# Patient Record
Sex: MALE | Race: AMERICAN INDIAN OR ALASKA NATIVE | NOT HISPANIC OR LATINO
[De-identification: names, ages, dates, MRNs, and addresses within clinical notes are randomized per-mention and may not be internally consistent; named-entity substitution may affect disease eponyms.]

---

## 2017-02-19 ENCOUNTER — APPOINTMENT (OUTPATIENT)
Dept: MRI IMAGING | Facility: IMAGING CENTER | Age: 32
End: 2017-02-19

## 2017-02-19 ENCOUNTER — OUTPATIENT (OUTPATIENT)
Dept: OUTPATIENT SERVICES | Facility: HOSPITAL | Age: 32
LOS: 1 days | End: 2017-02-19
Payer: COMMERCIAL

## 2017-02-19 DIAGNOSIS — Z00.8 ENCOUNTER FOR OTHER GENERAL EXAMINATION: ICD-10-CM

## 2017-02-19 PROCEDURE — A9585: CPT

## 2017-02-19 PROCEDURE — 70553 MRI BRAIN STEM W/O & W/DYE: CPT

## 2017-08-22 ENCOUNTER — APPOINTMENT (OUTPATIENT)
Dept: OPHTHALMOLOGY | Facility: CLINIC | Age: 32
End: 2017-08-22
Payer: SELF-PAY

## 2017-08-22 PROCEDURE — 00009: CPT

## 2017-11-29 ENCOUNTER — APPOINTMENT (OUTPATIENT)
Dept: OPHTHALMOLOGY | Facility: CLINIC | Age: 32
End: 2017-11-29
Payer: COMMERCIAL

## 2017-11-29 PROCEDURE — 00009: CPT

## 2018-01-02 ENCOUNTER — RX RENEWAL (OUTPATIENT)
Age: 33
End: 2018-01-02

## 2018-01-02 DIAGNOSIS — H52.13 MYOPIA, BILATERAL: ICD-10-CM

## 2018-01-03 ENCOUNTER — RX RENEWAL (OUTPATIENT)
Age: 33
End: 2018-01-03

## 2018-01-10 ENCOUNTER — APPOINTMENT (OUTPATIENT)
Dept: OPHTHALMOLOGY | Facility: AMBULATORY MEDICAL SERVICES | Age: 33
End: 2018-01-10

## 2018-01-11 ENCOUNTER — APPOINTMENT (OUTPATIENT)
Dept: OPHTHALMOLOGY | Facility: CLINIC | Age: 33
End: 2018-01-11

## 2018-01-18 ENCOUNTER — APPOINTMENT (OUTPATIENT)
Dept: OPHTHALMOLOGY | Facility: CLINIC | Age: 33
End: 2018-01-18

## 2018-07-02 ENCOUNTER — APPOINTMENT (OUTPATIENT)
Dept: ORTHOPEDIC SURGERY | Facility: CLINIC | Age: 33
End: 2018-07-02
Payer: COMMERCIAL

## 2018-07-02 VITALS
HEIGHT: 71 IN | HEART RATE: 60 BPM | BODY MASS INDEX: 29.4 KG/M2 | SYSTOLIC BLOOD PRESSURE: 107 MMHG | DIASTOLIC BLOOD PRESSURE: 73 MMHG | WEIGHT: 210 LBS

## 2018-07-02 DIAGNOSIS — M25.511 PAIN IN RIGHT SHOULDER: ICD-10-CM

## 2018-07-02 PROCEDURE — 99204 OFFICE O/P NEW MOD 45 MIN: CPT

## 2018-07-02 PROCEDURE — 73030 X-RAY EXAM OF SHOULDER: CPT | Mod: RT

## 2018-07-02 RX ORDER — LEVOTHYROXINE SODIUM 100 UG/1
100 TABLET ORAL
Refills: 0 | Status: DISCONTINUED | COMMUNITY

## 2018-07-02 RX ORDER — OXYCODONE AND ACETAMINOPHEN 5; 325 MG/1; MG/1
5-325 TABLET ORAL
Qty: 20 | Refills: 0 | Status: DISCONTINUED | COMMUNITY
Start: 2018-01-03 | End: 2018-07-02

## 2018-07-02 RX ORDER — OFLOXACIN 3 MG/ML
0.3 SOLUTION/ DROPS OPHTHALMIC 4 TIMES DAILY
Qty: 1 | Refills: 5 | Status: DISCONTINUED | COMMUNITY
Start: 2018-01-02 | End: 2018-07-02

## 2018-07-02 RX ORDER — FLUOROMETHOLONE 1 MG/ML
0.1 SOLUTION/ DROPS OPHTHALMIC 4 TIMES DAILY
Qty: 1 | Refills: 5 | Status: DISCONTINUED | COMMUNITY
Start: 2018-01-02 | End: 2018-07-02

## 2018-07-03 ENCOUNTER — RX RENEWAL (OUTPATIENT)
Age: 33
End: 2018-07-03

## 2018-07-03 RX ORDER — MELOXICAM 15 MG/1
15 TABLET ORAL DAILY
Qty: 30 | Refills: 1 | Status: DISCONTINUED | COMMUNITY
Start: 2018-07-03 | End: 2018-07-03

## 2018-07-03 RX ORDER — MELOXICAM 15 MG/1
15 TABLET ORAL DAILY
Qty: 30 | Refills: 0 | Status: DISCONTINUED | COMMUNITY
Start: 2018-07-02 | End: 2018-07-03

## 2018-10-29 ENCOUNTER — APPOINTMENT (OUTPATIENT)
Dept: RADIOLOGY | Facility: CLINIC | Age: 33
End: 2018-10-29

## 2018-10-29 ENCOUNTER — OUTPATIENT (OUTPATIENT)
Dept: OUTPATIENT SERVICES | Facility: HOSPITAL | Age: 33
LOS: 1 days | End: 2018-10-29
Payer: COMMERCIAL

## 2018-10-29 DIAGNOSIS — R53.83 OTHER FATIGUE: ICD-10-CM

## 2018-10-29 PROCEDURE — 71046 X-RAY EXAM CHEST 2 VIEWS: CPT | Mod: 26

## 2018-10-29 PROCEDURE — 71046 X-RAY EXAM CHEST 2 VIEWS: CPT

## 2019-01-25 ENCOUNTER — TRANSCRIPTION ENCOUNTER (OUTPATIENT)
Age: 34
End: 2019-01-25

## 2019-08-26 ENCOUNTER — OUTPATIENT (OUTPATIENT)
Dept: OUTPATIENT SERVICES | Facility: HOSPITAL | Age: 34
LOS: 1 days | End: 2019-08-26
Payer: COMMERCIAL

## 2019-08-26 ENCOUNTER — APPOINTMENT (OUTPATIENT)
Dept: ULTRASOUND IMAGING | Facility: CLINIC | Age: 34
End: 2019-08-26
Payer: COMMERCIAL

## 2019-08-26 DIAGNOSIS — Z00.8 ENCOUNTER FOR OTHER GENERAL EXAMINATION: ICD-10-CM

## 2019-08-26 PROCEDURE — 76700 US EXAM ABDOM COMPLETE: CPT

## 2019-08-26 PROCEDURE — 76700 US EXAM ABDOM COMPLETE: CPT | Mod: 26

## 2019-12-12 ENCOUNTER — APPOINTMENT (OUTPATIENT)
Dept: HEPATOLOGY | Facility: CLINIC | Age: 34
End: 2019-12-12

## 2020-04-25 ENCOUNTER — MESSAGE (OUTPATIENT)
Age: 35
End: 2020-04-25

## 2020-05-03 LAB
SARS-COV-2 IGG SERPL IA-ACNC: <0.1 INDEX
SARS-COV-2 IGG SERPL QL IA: NEGATIVE

## 2020-05-07 ENCOUNTER — APPOINTMENT (OUTPATIENT)
Dept: DISASTER EMERGENCY | Facility: CLINIC | Age: 35
End: 2020-05-07

## 2021-08-04 ENCOUNTER — NON-APPOINTMENT (OUTPATIENT)
Age: 36
End: 2021-08-04

## 2021-08-25 ENCOUNTER — NON-APPOINTMENT (OUTPATIENT)
Age: 36
End: 2021-08-25

## 2021-08-25 ENCOUNTER — APPOINTMENT (OUTPATIENT)
Dept: OTOLARYNGOLOGY | Facility: CLINIC | Age: 36
End: 2021-08-25
Payer: COMMERCIAL

## 2021-08-25 VITALS
BODY MASS INDEX: 29.4 KG/M2 | TEMPERATURE: 97.2 F | HEART RATE: 75 BPM | SYSTOLIC BLOOD PRESSURE: 124 MMHG | HEIGHT: 71 IN | DIASTOLIC BLOOD PRESSURE: 75 MMHG | WEIGHT: 210 LBS

## 2021-08-25 DIAGNOSIS — H92.03 OTALGIA, BILATERAL: ICD-10-CM

## 2021-08-25 DIAGNOSIS — Z01.10 ENCOUNTER FOR EXAMINATION OF EARS AND HEARING W/OUT ABNORMAL FINDINGS: ICD-10-CM

## 2021-08-25 DIAGNOSIS — M26.609 UNSPECIFIED TEMPOROMANDIBULAR JOINT DISORDER: ICD-10-CM

## 2021-08-25 PROCEDURE — 92557 COMPREHENSIVE HEARING TEST: CPT

## 2021-08-25 PROCEDURE — 99203 OFFICE O/P NEW LOW 30 MIN: CPT | Mod: 25

## 2021-08-25 PROCEDURE — 92567 TYMPANOMETRY: CPT

## 2021-08-25 NOTE — HISTORY OF PRESENT ILLNESS
[No] : patient does not have a  history of radiation therapy [Ear Fullness] : ear fullness [Otalgia] : otalgia [None] : No risk factors have been identified. [de-identified] : 36 yo male\par Patient complains of bilateral ear pain x 6 months. States its an intermittent dull aching pain, worse at night and when exposed to cold air. He does grind his teeth at night. Feels like his hearing has decreased as well. Pt has no ear drainage, tinnitus, vertigo, nasal congestion, nasal discharge, epistaxis, sinus infections, facial pain, facial pressure, throat pain, dysphagia or fevers\par \par  [Otorrhea] : no otorrhea [Eustachian Tube Dysfunction] : no eustachian tube dysfunction [Cholesteatoma] : no cholesteatoma [Early Onset Hearing Loss] : no early onset hearing loss [Stroke] : no stroke [Allergic Rhinitis] : no allergic rhinitis [Adenoidectomy] : no adenoidectomy [Allergies] : no allergies [Asthma] : no asthma [Hyperthyroidism] : no hyperthyroidism [Sialadenitis] : no sialadenitis [Hodgkin Disease] : no hodgkin disease [Non-Hodgkin Lymphoma] : no non-hodgkin lymphoma [Graves Disease] : no graves disease [Thyroid Cancer] : no thyroid cancer

## 2021-08-25 NOTE — PHYSICAL EXAM
[Midline] : trachea located in midline position [Normal] : no rashes [de-identified] : b/l tenderness

## 2021-08-25 NOTE — END OF VISIT
[Time Spent: ___ minutes] : I have spent [unfilled] minutes of time on the encounter. [FreeTextEntry3] : I personally saw and examined LENNY WEI in detail. I spoke to VALARIE Baltazar regarding the assessment and plan of care. I reviewed the above assessment and plan of care, and agree. I have made changes in changes in the body of the note where appropriate.I personally reviewed the HPI, PMH, FH, SH, ROS and medications/allergies. I have spoken to VALARIE Baltazar regarding the history and have personally determined the assessment and plan of care, and documented this myself. I was present and participated in all key portions of the encounter and all procedures noted above. I have made changes in the body of the note where appropriate.\par \par Attesting Faculty: See Attending Signature Below \par \par \par

## 2021-08-25 NOTE — ASSESSMENT
[FreeTextEntry1] : Patient complains of decreased hearing and bilateral ear pain x 6 months \par \par Ear pain due to TMJ:\par -soft food diet \par -dental evaluation \par -Advil for pain \par -warm and cold compresses\par \par f/u prn

## 2022-03-07 ENCOUNTER — OUTPATIENT (OUTPATIENT)
Dept: OUTPATIENT SERVICES | Facility: HOSPITAL | Age: 37
LOS: 1 days | End: 2022-03-07
Payer: COMMERCIAL

## 2022-03-07 DIAGNOSIS — M54.2 CERVICALGIA: ICD-10-CM

## 2022-03-07 PROCEDURE — 72141 MRI NECK SPINE W/O DYE: CPT | Mod: 26

## 2022-03-07 PROCEDURE — 72141 MRI NECK SPINE W/O DYE: CPT

## 2022-05-01 ENCOUNTER — NON-APPOINTMENT (OUTPATIENT)
Age: 37
End: 2022-05-01

## 2022-11-28 DIAGNOSIS — E03.9 HYPOTHYROIDISM, UNSPECIFIED: ICD-10-CM

## 2022-11-28 DIAGNOSIS — Z83.1 FAMILY HISTORY OF OTHER INFECTIOUS AND PARASITIC DISEASES: ICD-10-CM

## 2022-11-28 RX ORDER — CYANOCOBALAMIN (VITAMIN B-12) 1000 MCG
TABLET ORAL
Refills: 0 | Status: ACTIVE | COMMUNITY

## 2022-11-28 RX ORDER — LEVOTHYROXINE SODIUM 137 UG/1
TABLET ORAL
Refills: 0 | Status: ACTIVE | COMMUNITY

## 2022-11-29 ENCOUNTER — APPOINTMENT (OUTPATIENT)
Dept: SURGERY | Facility: CLINIC | Age: 37
End: 2022-11-29

## 2022-11-29 ENCOUNTER — TRANSCRIPTION ENCOUNTER (OUTPATIENT)
Age: 37
End: 2022-11-29

## 2022-11-29 VITALS
SYSTOLIC BLOOD PRESSURE: 131 MMHG | DIASTOLIC BLOOD PRESSURE: 83 MMHG | HEIGHT: 71 IN | TEMPERATURE: 96.5 F | HEART RATE: 70 BPM | RESPIRATION RATE: 16 BRPM | OXYGEN SATURATION: 98 % | WEIGHT: 208 LBS | BODY MASS INDEX: 29.12 KG/M2

## 2022-11-29 PROCEDURE — 99203 OFFICE O/P NEW LOW 30 MIN: CPT

## 2022-11-29 NOTE — CONSULT LETTER
[Dear  ___] : Dear  [unfilled], [Consult Letter:] : I had the pleasure of evaluating your patient, [unfilled]. [Please see my note below.] : Please see my note below. [Consult Closing:] : Thank you very much for allowing me to participate in the care of this patient.  If you have any questions, please do not hesitate to contact me. [Sincerely,] : Sincerely, [FreeTextEntry3] : Ramses Bashir M.D., F.A.C.S, F.A.S.C.R.S

## 2022-11-29 NOTE — ASSESSMENT
[FreeTextEntry1] : In summary the patient has had left groin pain for the past several months.  He physically active playing cricket.  There is no left inguinal hernia palpable on exam.  I suspect he either has a left groin strain or possibly epididymitis.  I referred him to urology for evaluation of epididymitis and recommended a trial of nonsteroidal anti-inflammatories in the meantime.

## 2022-11-29 NOTE — PHYSICAL EXAM
[Normal Breath Sounds] : Normal breath sounds [Normal Heart Sounds] : normal heart sounds [No Rash or Lesion] : No rash or lesion [Alert] : alert [Oriented to Person] : oriented to person [Oriented to Place] : oriented to place [Oriented to Time] : oriented to time [Calm] : calm [de-identified] : WNL [de-identified] : WNL [de-identified] : CRYSTALL [de-identified] : Soft, nontender, no palpable inguinal or femoral hernias bilaterally.  There is focal left groin tenderness on the symphysis and mild tenderness of the left testicle and spermatic cord. [de-identified] : WNL ROM [de-identified] : WNL

## 2022-12-27 ENCOUNTER — APPOINTMENT (OUTPATIENT)
Dept: UROLOGY | Facility: CLINIC | Age: 37
End: 2022-12-27

## 2022-12-27 VITALS
DIASTOLIC BLOOD PRESSURE: 81 MMHG | TEMPERATURE: 98.3 F | HEART RATE: 80 BPM | SYSTOLIC BLOOD PRESSURE: 147 MMHG | OXYGEN SATURATION: 96 % | RESPIRATION RATE: 18 BRPM | HEIGHT: 71 IN | WEIGHT: 206 LBS | BODY MASS INDEX: 28.84 KG/M2

## 2022-12-27 PROCEDURE — 99204 OFFICE O/P NEW MOD 45 MIN: CPT

## 2022-12-27 NOTE — PHYSICAL EXAM
[Normal Appearance] : normal appearance [Well Groomed] : well groomed [Edema] : no peripheral edema [Exaggerated Use Of Accessory Muscles For Inspiration] : no accessory muscle use [Abdomen Tenderness] : non-tender [Costovertebral Angle Tenderness] : no ~M costovertebral angle tenderness [Urethral Meatus] : meatus normal [Normal Station and Gait] : the gait and station were normal for the patient's age [Skin Color & Pigmentation] : normal skin color and pigmentation [No Focal Deficits] : no focal deficits [No Palpable Adenopathy] : no palpable adenopathy [FreeTextEntry1] : Right - no epididymal, testicular tenderness. No testicular mass. Left - no testicular mass. Mild epididymal tenderness, no engorgement. Mild tenderness with palpation of the spermatic cord. No hernia bilaterally. Mild tenderness with palpation of left groin musculature

## 2022-12-27 NOTE — ASSESSMENT
[FreeTextEntry1] : 37 y.o. M with left scrotal pain x 6 months. Exam consistent with mild epididymitis\par - NSAIDs x 2 weeks\par - Formal scrotal US\par - Scrotal support / tight underwear\par - Warm baths x 2 nights\par - Avoidance of aggravating activity\par - TTM 3-4 weeks

## 2022-12-27 NOTE — HISTORY OF PRESENT ILLNESS
[FreeTextEntry1] : LENNY WEI is a 37 year M who presents today as a new patient evaluation for scrotal pain.\par \par Approximately 6 months ago, after playing sports, he began to experience left-sided groin pain.  This is intermittent, and squeezing in nature. No radiation. Seems to be worse with activity. No testicular swelling, erythema. No associated gross hematuria, dysuria. He thinks he had testicular swelling as a child but did not have a procedure for this.  He was seen by Dr. Bashir to rule out a hernia, and was told there is no hernia. Denies gross hematuria, flank pain, fevers, chills, nausea, vomiting.

## 2022-12-27 NOTE — LETTER BODY
[Dear  ___] : Dear  [unfilled], [Please see my note below.] : Please see my note below. [Consult Closing:] : Thank you very much for allowing me to participate in the care of this patient.  If you have any questions, please do not hesitate to contact me. [Sincerely,] : Sincerely, [FreeTextEntry2] : Guanaco Loera DO\par [address] [facility] [address], [place] [address] [FreeTextEntry3] : Rai Worthy MD\par [facility] [place]\par [address] [place] [address], [address]\par [place], [place]\par [address]\par p: [phone]\par f: [phone]

## 2023-01-09 ENCOUNTER — OUTPATIENT (OUTPATIENT)
Dept: OUTPATIENT SERVICES | Facility: HOSPITAL | Age: 38
LOS: 1 days | End: 2023-01-09

## 2023-01-09 ENCOUNTER — APPOINTMENT (OUTPATIENT)
Dept: ULTRASOUND IMAGING | Facility: CLINIC | Age: 38
End: 2023-01-09
Payer: COMMERCIAL

## 2023-01-09 DIAGNOSIS — N50.82 SCROTAL PAIN: ICD-10-CM

## 2023-01-09 PROCEDURE — 93975 VASCULAR STUDY: CPT | Mod: 26

## 2023-01-17 ENCOUNTER — APPOINTMENT (OUTPATIENT)
Dept: UROLOGY | Facility: CLINIC | Age: 38
End: 2023-01-17
Payer: COMMERCIAL

## 2023-01-17 PROCEDURE — 99441: CPT

## 2023-03-21 ENCOUNTER — APPOINTMENT (OUTPATIENT)
Dept: UROLOGY | Facility: CLINIC | Age: 38
End: 2023-03-21
Payer: COMMERCIAL

## 2023-03-21 DIAGNOSIS — R10.30 LOWER ABDOMINAL PAIN, UNSPECIFIED: ICD-10-CM

## 2023-03-21 DIAGNOSIS — N50.82 SCROTAL PAIN: ICD-10-CM

## 2023-03-21 PROCEDURE — 99214 OFFICE O/P EST MOD 30 MIN: CPT

## 2023-03-21 NOTE — PHYSICAL EXAM
[Normal Appearance] : normal appearance [Abdomen Tenderness] : non-tender [Urethral Meatus] : meatus normal [Epididymis] : the epididymides were normal [Testes Tenderness] : no tenderness of the testes [Testes Mass (___cm)] : there were no testicular masses

## 2023-03-21 NOTE — HISTORY OF PRESENT ILLNESS
[FreeTextEntry1] : He is a 37-year-old male who presents as a follow-up for scrotal pain.\par \par To review, he was seen by me in December 2022 with 6 months of scrotal pain.\par \par Scrotal ultrasound January 2023: Small right epididymal cyst, otherwise normal\par \par 3/21/2023\par Pain significantly improved\par Now only have mild testicular pain after sexual intercourse or when probing the area.\par Also with ED. Was recently diagnosed with hepatitis C and was given medication for this (unknown which medication). Since administration of this medication, has had intermittent ED\par Difficulty keeping the erection, not maintaining. libido good

## 2023-03-21 NOTE — ASSESSMENT
[FreeTextEntry1] : 37 y.o. M with scrotal pain, ED\par \par #Scrotal pain\par - Resolved\par - US reviewed - no torsion, testicular masses\par \par #ED\par - In terms of his erectile dysfunction, I counseled the patient. I discussed the various etiologies of erectile dysfunction. I recommended that he obtain a testosterone level, FSH, LH, prolactin.  We also discussed lifestyle modifications, specifically that diet, exercising, and weight loss can improve erectile function\par - We discussed various options for treating his erectile dysfunction, specifically lifestyle medications, oral agents (PDE5i), intracavernosal injections, intraurethral suppositories, YEVGENIY devices, and finally IPP.  We discussed we typically start in a stepwise fashion.\par - I reviewed the use of oral PDE 5 inhibitors today with the patient for management of erectile dysfunction.  I have explained the proper use and potential side effects that could be experienced.  We discussed using the medication on an empty stomach to maximize absorption.  We also reviewed the potential for headaches, sinus congestion, and facial flushing which are all potential side effects of the medication.  Depending on efficacy, we also reviewed the fact that other treatment options could be considered and dosages adjusted.\par  - Not interested in oral agents at this time

## 2023-04-04 ENCOUNTER — APPOINTMENT (OUTPATIENT)
Dept: UROLOGY | Facility: CLINIC | Age: 38
End: 2023-04-04
Payer: COMMERCIAL

## 2023-04-04 DIAGNOSIS — N52.9 MALE ERECTILE DYSFUNCTION, UNSPECIFIED: ICD-10-CM

## 2023-04-04 LAB
ALBUMIN SERPL ELPH-MCNC: 4.6 G/DL
ALP BLD-CCNC: 74 U/L
ALT SERPL-CCNC: 26 U/L
ANION GAP SERPL CALC-SCNC: 13 MMOL/L
AST SERPL-CCNC: 28 U/L
BASOPHILS # BLD AUTO: 0.09 K/UL
BASOPHILS NFR BLD AUTO: 1.1 %
BILIRUB SERPL-MCNC: 0.4 MG/DL
BUN SERPL-MCNC: 14 MG/DL
CALCIUM SERPL-MCNC: 9.8 MG/DL
CHLORIDE SERPL-SCNC: 104 MMOL/L
CO2 SERPL-SCNC: 22 MMOL/L
CREAT SERPL-MCNC: 1.03 MG/DL
EGFR: 95 ML/MIN/1.73M2
EOSINOPHIL # BLD AUTO: 0.54 K/UL
EOSINOPHIL NFR BLD AUTO: 6.9 %
FSH SERPL-MCNC: 3.9 IU/L
GLUCOSE SERPL-MCNC: 101 MG/DL
HCT VFR BLD CALC: 42.8 %
HGB BLD-MCNC: 14.4 G/DL
IMM GRANULOCYTES NFR BLD AUTO: 0.5 %
LH SERPL-ACNC: 7.6 IU/L
LYMPHOCYTES # BLD AUTO: 2.24 K/UL
LYMPHOCYTES NFR BLD AUTO: 28.5 %
MAN DIFF?: NORMAL
MCHC RBC-ENTMCNC: 28.6 PG
MCHC RBC-ENTMCNC: 33.6 GM/DL
MCV RBC AUTO: 84.9 FL
MONOCYTES # BLD AUTO: 0.82 K/UL
MONOCYTES NFR BLD AUTO: 10.4 %
NEUTROPHILS # BLD AUTO: 4.12 K/UL
NEUTROPHILS NFR BLD AUTO: 52.6 %
PLATELET # BLD AUTO: 268 K/UL
POTASSIUM SERPL-SCNC: 4.3 MMOL/L
PROT SERPL-MCNC: 7.6 G/DL
RBC # BLD: 5.04 M/UL
RBC # FLD: 13 %
SODIUM SERPL-SCNC: 139 MMOL/L
TESTOST FREE SERPL-MCNC: 9.4 PG/ML
TESTOST SERPL-MCNC: 459 NG/DL
TSH SERPL-ACNC: 5.71 UIU/ML
WBC # FLD AUTO: 7.85 K/UL

## 2023-04-04 PROCEDURE — 99442: CPT

## 2023-04-04 RX ORDER — SILDENAFIL 50 MG/1
50 TABLET ORAL
Qty: 30 | Refills: 3 | Status: ACTIVE | COMMUNITY
Start: 2023-04-04 | End: 1900-01-01

## 2023-04-04 NOTE — HISTORY OF PRESENT ILLNESS
[FreeTextEntry1] : This visit was provided via audio technology. The patient was located at home, at the time of the visit. \par The provider, Rai Worthy, was located at [place], [place] at the time of the visit. The patient and provider participated in the virtual visit. \par Verbal consent for virtual services was given on 4/4/2023 by the patient\par \par He is a 37-year-old male who presents as a follow-up for scrotal pain.\par \par To review, he was seen by me in December 2022 with 6 months of scrotal pain.\par \par Scrotal ultrasound January 2023: Small right epididymal cyst, otherwise normal\par \par 3/21/2023\par Pain significantly improved\par Now only have mild testicular pain after sexual intercourse or when probing the area.\par Also with ED. Was recently diagnosed with hepatitis C and was given medication for this (unknown which medication). Since administration of this medication, has had intermittent ED\par Difficulty keeping the erection, not maintaining. libido good\par \par 4/4/2023\par telephone visit\par Labs reviewed - testosterone and hormone levels wnl\par TSH high - he has known hypothyroidism on synthroid\par No scrotal pain\par Interested in medications for ED

## 2023-04-04 NOTE — ASSESSMENT
[FreeTextEntry1] : 37 y.o. M with scrotal pain, ED\par \par #Scrotal pain\par - Resolved\par - US reviewed - no torsion, testicular masses\par \par #ED\par - Labs reviewed\par - I reviewed the use of oral PDE 5 inhibitors today with the patient for management of erectile dysfunction.  I have explained the proper use and potential side effects that could be experienced.  We discussed using the medication on an empty stomach to maximize absorption.  We also reviewed the potential for headaches, sinus congestion, and facial flushing which are all potential side effects of the medication.  Depending on efficacy, we also reviewed the fact that other treatment options could be considered and dosages adjusted.\par - SIldenafil 50mg prescribed\par \par Telehealth Consultation: 13 minutes - 6 minutes reviewing his history and discussing prior results. 7 minutes discussing various treatment options and writing his note.

## 2023-09-20 ENCOUNTER — APPOINTMENT (OUTPATIENT)
Age: 38
End: 2023-09-20
Payer: COMMERCIAL

## 2023-09-20 PROCEDURE — D7140: CPT

## 2023-09-20 PROCEDURE — D4263: CPT

## 2023-09-26 ENCOUNTER — APPOINTMENT (OUTPATIENT)
Age: 38
End: 2023-09-26
Payer: COMMERCIAL

## 2023-09-26 PROCEDURE — 99024 POSTOP FOLLOW-UP VISIT: CPT

## 2023-09-27 ENCOUNTER — APPOINTMENT (OUTPATIENT)
Age: 38
End: 2023-09-27
Payer: COMMERCIAL

## 2023-09-27 PROCEDURE — 99024 POSTOP FOLLOW-UP VISIT: CPT

## 2023-10-04 ENCOUNTER — APPOINTMENT (OUTPATIENT)
Age: 38
End: 2023-10-04
Payer: COMMERCIAL

## 2023-10-04 PROCEDURE — 99024 POSTOP FOLLOW-UP VISIT: CPT

## 2023-11-29 ENCOUNTER — APPOINTMENT (OUTPATIENT)
Age: 38
End: 2023-11-29

## 2024-01-03 ENCOUNTER — APPOINTMENT (OUTPATIENT)
Dept: ORTHOPEDIC SURGERY | Facility: CLINIC | Age: 39
End: 2024-01-03

## 2024-01-10 ENCOUNTER — APPOINTMENT (OUTPATIENT)
Dept: ORTHOPEDIC SURGERY | Facility: CLINIC | Age: 39
End: 2024-01-10

## 2024-01-10 ENCOUNTER — NON-APPOINTMENT (OUTPATIENT)
Age: 39
End: 2024-01-10

## 2024-01-12 ENCOUNTER — APPOINTMENT (OUTPATIENT)
Dept: PHYSICAL MEDICINE AND REHAB | Facility: CLINIC | Age: 39
End: 2024-01-12
Payer: COMMERCIAL

## 2024-01-12 VITALS
HEART RATE: 75 BPM | RESPIRATION RATE: 14 BRPM | BODY MASS INDEX: 29.68 KG/M2 | HEIGHT: 71 IN | DIASTOLIC BLOOD PRESSURE: 80 MMHG | SYSTOLIC BLOOD PRESSURE: 119 MMHG | WEIGHT: 212 LBS

## 2024-01-12 PROCEDURE — 72120 X-RAY BEND ONLY L-S SPINE: CPT

## 2024-01-12 PROCEDURE — 99204 OFFICE O/P NEW MOD 45 MIN: CPT

## 2024-01-12 NOTE — HISTORY OF PRESENT ILLNESS
[FreeTextEntry1] : Mr Mclaughlin is a 39 y/o M with pmhx hypothyroidism, presenting for R low back pain radiating down posterior thigh, posterolateral calf and into plantar aspect of R foot. The pain started 6-7 months ago, has been intermittent. Recent exacerbation 4-5 weeks ago after a long flight and has not been improving since then. 4/10 at rest. At work, prolonged standing, lying down worsen the pain, difficulty sleeping. The pain and sleep are slightly improved with gabapentin 300mg at night which he started taking one week ago. He notes no improvement with NSAIDs or Tylenol. A week and half ago had one session with chiropractor and is not interested in returning. Denies bowel/bladdery dysfunction, fevers, night sweats, or chills. Denies weakness or falls. +numbness/tingling in the foot.   Has performed MRI of L spine, not available in system.

## 2024-01-12 NOTE — ASSESSMENT
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  I spent most of today's office visit (40 minutes) reviewing the patient's x-rays lumbar spine, discussing etiology, pathogenesis, further diagnostic workup and nonoperative versus operative management.  X-rays lumbar spine significant for minimal grade 1 retrolisthesis L4 over L5 and mild to moderate L5-S1 disc space height loss.  I cautioned the patient on the chronic use of p.o. NSAIDs secondary to their GI, cardiac and renal toxicities.  We discussed the risks, benefits and alternatives to low-dose gabapentin 300 mg; 1-2 caps p.o. nightly as tolerated.  I educated the patient on the risks of CNS depression and fluid retention.  I provided him with a prescription for physical therapy to focus on pain relieving modalities, gentle range of motion, stretching and strengthening exercises.  We will obtain an MRI scan of the patient's lumbar spine without contrast to rule out L5-S1 DDD/HNP.  Depending upon the results of the patient's MRI and his response to physical therapy intervention, we will then determine the necessity of a lumbar epidural steroid injection.  Patient is in agreement with the plan.  All questions have been answered.  Return to office 1 to 2 weeks for MRI review.

## 2024-01-12 NOTE — DATA REVIEWED
[Plain X-Rays] : plain X-Rays [FreeTextEntry1] : X-rays lumbar spine (2 views obtained at today's office visit): Significant for minimal grade 1 retrolisthesis L4 over L5 and mild to moderate L5-S1 disc space height loss.

## 2024-01-12 NOTE — PHYSICAL EXAM
[FreeTextEntry1] : Gen: NO acute distress MSK: Full flexion with pain on extension from flexed position. Extension 20-25' no pain. Strength: Bilateral: 5/5 HF, 5/5 KE, 5/5 DF, 5/5 PF, Able to heel- and toe-walk. TTP R lumbar paraspinals and sciatic notch, no TTP to SIJ or greater troch Reflexes 2+ patellar bilateral, 1+ B/L achilles, negative clonus Sensation intact to LT/PP L4-S1 dermatomes  TERE positive for R buttocks pain FADIR negative Smooth IR/ER of hip with full ROM SLR positive on R for numbness/tingling at plantar foot and buttocks pain

## 2024-01-16 ENCOUNTER — OUTPATIENT (OUTPATIENT)
Dept: OUTPATIENT SERVICES | Facility: HOSPITAL | Age: 39
LOS: 1 days | End: 2024-01-16
Payer: COMMERCIAL

## 2024-01-16 DIAGNOSIS — M54.16 RADICULOPATHY, LUMBAR REGION: ICD-10-CM

## 2024-01-16 PROCEDURE — 72148 MRI LUMBAR SPINE W/O DYE: CPT

## 2024-01-16 PROCEDURE — 72148 MRI LUMBAR SPINE W/O DYE: CPT | Mod: 26

## 2024-02-07 ENCOUNTER — APPOINTMENT (OUTPATIENT)
Age: 39
End: 2024-02-07

## 2024-03-06 ENCOUNTER — APPOINTMENT (OUTPATIENT)
Age: 39
End: 2024-03-06
Payer: COMMERCIAL

## 2024-03-06 PROCEDURE — D0381: CPT

## 2024-03-11 ENCOUNTER — NON-APPOINTMENT (OUTPATIENT)
Age: 39
End: 2024-03-11

## 2024-03-25 ENCOUNTER — APPOINTMENT (OUTPATIENT)
Dept: PHYSICAL MEDICINE AND REHAB | Facility: CLINIC | Age: 39
End: 2024-03-25
Payer: COMMERCIAL

## 2024-03-25 VITALS
DIASTOLIC BLOOD PRESSURE: 70 MMHG | HEART RATE: 66 BPM | WEIGHT: 212 LBS | BODY MASS INDEX: 29.68 KG/M2 | HEIGHT: 71 IN | RESPIRATION RATE: 14 BRPM | SYSTOLIC BLOOD PRESSURE: 104 MMHG

## 2024-03-25 PROCEDURE — 99214 OFFICE O/P EST MOD 30 MIN: CPT

## 2024-03-25 NOTE — ASSESSMENT
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  I spent most of today's office visit (30 minutes) reviewing the patient's MRI lumbar spine, discussing etiology, pathogenesis, further diagnostic workup and nonoperative versus operative management.  MRI lumbar spine largely unremarkable without significant HNP, central canal or neuroforaminal narrowing on the patient's symptomatic right side.  X-rays lumbar spine previously reviewed significant for minimal grade 1 retrolisthesis L4 over L5 and mild to moderate L5-S1 disc space height loss.  Given the patient's nondiagnostic MRI and prominence of radiating leg more so than lower back pain, we will proceed with electrodiagnostic testing of his right leg to rule out more proximal pathologies such as lumbosacral plexitis versus sciatic or distal entrapment mononeuropathy.  I again cautioned the patient on the chronic use of p.o. NSAIDs secondary to their GI, cardiac and renal toxicities.   Patient is in agreement with the plan.  All questions have been answered.  Return to office 1 to 2 weeks for EMG.

## 2024-03-25 NOTE — HISTORY OF PRESENT ILLNESS
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  Results detailed below.  Pt. has been in P.T. for last 4-6 weeks which has not been very helpful.  He is bothered more by the burning and tingling radiating down his right leg to plantar surface right foot than the LBP.  Just started taking gabapentin to help him sleep.

## 2024-03-25 NOTE — DATA REVIEWED
[Plain X-Rays] : plain X-Rays [FreeTextEntry1] : MRI L-spine (March 2024): LOWER THORACIC SPINE: No spinal canal or neuroforaminal stenosis.  L1-L2: No spinal canal stenosis. No foraminal narrowing. L2-L3: No spinal canal stenosis. Minimal bilateral foraminal narrowing. L3-L4: No spinal canal stenosis. Mild bilateral foraminal narrowing. L4-L5: Mild bilateral facet arthrosis. No spinal canal stenosis. Mild bilateral foraminal narrowing. L5-S1: Mild bilateral facet arthrosis. No spinal canal stenosis.   IMPRESSION:  Congenital spinal canal stenosis. No significant superimposed degenerative spinal canal narrowing. Mild bilateral foraminal narrowing at L3-4 and L4-L5.  X-rays lumbar spine (Jan 2024): Significant for minimal grade 1 retrolisthesis L4 over L5 and mild to moderate L5-S1 disc space height loss.

## 2024-03-25 NOTE — PHYSICAL EXAM
[FreeTextEntry1] : Gen: NO acute distress MSK: Full flexion with pain on extension from flexed position. Extension 20-25' no pain. Strength: Bilateral: 5/5 HF, 5/5 KE, 4+/5 DF (relatively new), 5/5 PF Able to heel- and toe-walk. TTP R lumbar paraspinals and sciatic notch, no TTP to SIJ or greater troch Reflexes 2+ patellar bilateral, 2+ B/L achilles No clonus Sensation intact to LT/PP L4-S1 dermatomes  SLR neg right TERE neg right FADIR negative Smooth IR/ER of hip with full ROM

## 2024-04-02 ENCOUNTER — APPOINTMENT (OUTPATIENT)
Dept: PHYSICAL MEDICINE AND REHAB | Facility: CLINIC | Age: 39
End: 2024-04-02
Payer: COMMERCIAL

## 2024-04-02 VITALS
BODY MASS INDEX: 29.68 KG/M2 | WEIGHT: 212 LBS | HEIGHT: 71 IN | SYSTOLIC BLOOD PRESSURE: 114 MMHG | HEART RATE: 76 BPM | DIASTOLIC BLOOD PRESSURE: 77 MMHG | RESPIRATION RATE: 14 BRPM

## 2024-04-02 DIAGNOSIS — M54.16 RADICULOPATHY, LUMBAR REGION: ICD-10-CM

## 2024-04-02 PROCEDURE — 95886 MUSC TEST DONE W/N TEST COMP: CPT | Mod: RT

## 2024-04-02 PROCEDURE — 95908 NRV CNDJ TST 3-4 STUDIES: CPT

## 2024-04-02 NOTE — PROCEDURE
[de-identified] : PRE-PROCEDURE  * Was H&P completed and in chart at time of procedure ?  YES * Were the indications for the procedure appropriate ?  YES * Were the practitioner's entries in the patient's medical record appropriate ?  YES  PROCEDURE * Did the practitioner maintain proper sterile technique ?  YES * If bleeding was encountered, did the practitioner manage it appropriately?  YES * Were complications, if any, recognized and managed appropriately?  YES * Was the practitioner's use of diagnostic services (e.g., lab, x-ray, MRI, CT) appropriate?  YES  POST-PROCEDURE * Did the pre-procedure diagnosis coincide with the findings of the procedure?  YES * Was the procedure report complete, accurate and timely?  YES

## 2024-04-02 NOTE — ASSESSMENT
[FreeTextEntry1] : EMG/NCS RIGHT LE performed at today's office visit.  Normal electrodiagnostic evaluation of the right leg.  There is no electrodiagnostic evidence of a lumbosacral radiculopathy, sciatic mononeuropathy or peripheral mononeuropathy.  Full report to follow.

## 2024-04-04 ENCOUNTER — APPOINTMENT (OUTPATIENT)
Age: 39
End: 2024-04-04
Payer: COMMERCIAL

## 2024-04-04 PROCEDURE — D6010D: CUSTOM

## 2024-04-11 ENCOUNTER — APPOINTMENT (OUTPATIENT)
Age: 39
End: 2024-04-11

## 2024-04-12 NOTE — HISTORY OF PRESENT ILLNESS
[de-identified] : Champ is a 37 year old male here  for a consultation, possible left inguinal hernia. \par \par Pt developed left inguinal pain that radiates now on his left testicle in July while playing sports. Pain is intermittent and exacerbated by physical activity. He denies inguinal bulge, nausea, vomiting. No imaging of the area has been done. He normally has  daily soft BMs. He is not on any blood thinners. Denies family h/o of hernia.  
Awake/Alert/Cooperative

## 2024-04-18 ENCOUNTER — APPOINTMENT (OUTPATIENT)
Age: 39
End: 2024-04-18
Payer: COMMERCIAL

## 2024-04-18 PROCEDURE — 99024 POSTOP FOLLOW-UP VISIT: CPT

## 2024-05-23 ENCOUNTER — APPOINTMENT (OUTPATIENT)
Age: 39
End: 2024-05-23
Payer: COMMERCIAL

## 2024-05-23 PROCEDURE — 99024 POSTOP FOLLOW-UP VISIT: CPT

## 2024-07-11 ENCOUNTER — APPOINTMENT (OUTPATIENT)
Age: 39
End: 2024-07-11
Payer: COMMERCIAL

## 2024-07-11 PROCEDURE — NTX: CUSTOM

## 2024-07-25 ENCOUNTER — APPOINTMENT (OUTPATIENT)
Age: 39
End: 2024-07-25

## 2024-07-25 PROCEDURE — PIP: CUSTOM

## 2024-07-27 ENCOUNTER — NON-APPOINTMENT (OUTPATIENT)
Age: 39
End: 2024-07-27

## 2024-08-08 ENCOUNTER — APPOINTMENT (OUTPATIENT)
Age: 39
End: 2024-08-08

## 2024-08-08 PROCEDURE — D6056: CPT

## 2024-08-08 PROCEDURE — D6058: CPT

## 2024-09-20 ENCOUNTER — APPOINTMENT (OUTPATIENT)
Dept: CARDIOLOGY | Facility: CLINIC | Age: 39
End: 2024-09-20
Payer: COMMERCIAL

## 2024-09-20 ENCOUNTER — NON-APPOINTMENT (OUTPATIENT)
Age: 39
End: 2024-09-20

## 2024-09-20 VITALS
OXYGEN SATURATION: 96 % | DIASTOLIC BLOOD PRESSURE: 68 MMHG | HEART RATE: 57 BPM | WEIGHT: 217 LBS | BODY MASS INDEX: 30.38 KG/M2 | SYSTOLIC BLOOD PRESSURE: 100 MMHG | HEIGHT: 71 IN

## 2024-09-20 VITALS
TEMPERATURE: 98.3 F | WEIGHT: 217 LBS | OXYGEN SATURATION: 96 % | DIASTOLIC BLOOD PRESSURE: 70 MMHG | BODY MASS INDEX: 30.38 KG/M2 | HEART RATE: 57 BPM | RESPIRATION RATE: 14 BRPM | SYSTOLIC BLOOD PRESSURE: 100 MMHG | HEIGHT: 71 IN

## 2024-09-20 VITALS — DIASTOLIC BLOOD PRESSURE: 70 MMHG | SYSTOLIC BLOOD PRESSURE: 100 MMHG

## 2024-09-20 DIAGNOSIS — R00.2 PALPITATIONS: ICD-10-CM

## 2024-09-20 DIAGNOSIS — Z82.3 FAMILY HISTORY OF STROKE: ICD-10-CM

## 2024-09-20 DIAGNOSIS — Z78.9 OTHER SPECIFIED HEALTH STATUS: ICD-10-CM

## 2024-09-20 DIAGNOSIS — Z82.49 FAMILY HISTORY OF ISCHEMIC HEART DISEASE AND OTHER DISEASES OF THE CIRCULATORY SYSTEM: ICD-10-CM

## 2024-09-20 DIAGNOSIS — E03.9 HYPOTHYROIDISM, UNSPECIFIED: ICD-10-CM

## 2024-09-20 DIAGNOSIS — R07.89 OTHER CHEST PAIN: ICD-10-CM

## 2024-09-20 PROCEDURE — 93000 ELECTROCARDIOGRAM COMPLETE: CPT

## 2024-09-20 PROCEDURE — 99204 OFFICE O/P NEW MOD 45 MIN: CPT

## 2024-09-20 NOTE — HISTORY OF PRESENT ILLNESS
[FreeTextEntry1] : Patient with hx hypothyroidism who came for cardiac evaluation with complain of having episode of palpitation ,1 month ago while he was in the bed and sleeping , woke up with it  was rapid regular , persisted for 10 minutes ,  no associated with  chest pain or shortness of breath or dizziness , resolved on its own , was evaluated in [facility] , no recurrence since then , patient also had left sided chest discomfort at rest on another day  a month ago , felt like pressure , radiating to left arm , not associated with any other symptoms denies any shortness of breath , no exertional chest pain or shortness of breath   patient blood pressure is normal range , was told to have normal range lipid profile

## 2024-09-20 NOTE — ASSESSMENT
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

## 2024-09-23 ENCOUNTER — APPOINTMENT (OUTPATIENT)
Dept: CARDIOLOGY | Facility: CLINIC | Age: 39
End: 2024-09-23
Payer: COMMERCIAL

## 2024-09-23 PROCEDURE — 93306 TTE W/DOPPLER COMPLETE: CPT

## 2024-09-25 LAB
25(OH)D3 SERPL-MCNC: 18.9 NG/ML
ALBUMIN SERPL ELPH-MCNC: 4.9 G/DL
ALP BLD-CCNC: 89 U/L
ALT SERPL-CCNC: 27 U/L
ANION GAP SERPL CALC-SCNC: 13 MMOL/L
AST SERPL-CCNC: 27 U/L
BASOPHILS # BLD AUTO: 0.07 K/UL
BASOPHILS NFR BLD AUTO: 1 %
BILIRUB SERPL-MCNC: 0.5 MG/DL
BUN SERPL-MCNC: 13 MG/DL
CALCIUM SERPL-MCNC: 10.2 MG/DL
CHLORIDE SERPL-SCNC: 105 MMOL/L
CHOLEST SERPL-MCNC: 184 MG/DL
CO2 SERPL-SCNC: 24 MMOL/L
CREAT SERPL-MCNC: 1.23 MG/DL
EGFR: 76 ML/MIN/1.73M2
EOSINOPHIL # BLD AUTO: 0.42 K/UL
EOSINOPHIL NFR BLD AUTO: 5.8 %
ESTIMATED AVERAGE GLUCOSE: 103 MG/DL
GLUCOSE SERPL-MCNC: 99 MG/DL
HBA1C MFR BLD HPLC: 5.2 %
HCT VFR BLD CALC: 44.9 %
HDLC SERPL-MCNC: 32 MG/DL
HGB BLD-MCNC: 15.1 G/DL
IMM GRANULOCYTES NFR BLD AUTO: 0.3 %
LDLC SERPL CALC-MCNC: 116 MG/DL
LYMPHOCYTES # BLD AUTO: 2.72 K/UL
LYMPHOCYTES NFR BLD AUTO: 37.8 %
MAN DIFF?: NORMAL
MCHC RBC-ENTMCNC: 29 PG
MCHC RBC-ENTMCNC: 33.6 GM/DL
MCV RBC AUTO: 86.3 FL
MONOCYTES # BLD AUTO: 0.72 K/UL
MONOCYTES NFR BLD AUTO: 10 %
NEUTROPHILS # BLD AUTO: 3.24 K/UL
NEUTROPHILS NFR BLD AUTO: 45.1 %
NONHDLC SERPL-MCNC: 152 MG/DL
PLATELET # BLD AUTO: 255 K/UL
POTASSIUM SERPL-SCNC: 5.5 MMOL/L
PROT SERPL-MCNC: 8.2 G/DL
RBC # BLD: 5.2 M/UL
RBC # FLD: 13.2 %
SODIUM SERPL-SCNC: 142 MMOL/L
TRIGL SERPL-MCNC: 202 MG/DL
TSH SERPL-ACNC: 6.94 UIU/ML
WBC # FLD AUTO: 7.19 K/UL

## 2024-09-25 RX ORDER — ERGOCALCIFEROL 1.25 MG/1
1.25 MG CAPSULE ORAL
Qty: 7 | Refills: 0 | Status: ACTIVE | COMMUNITY
Start: 2024-09-25 | End: 1900-01-01